# Patient Record
Sex: MALE | Race: WHITE | URBAN - METROPOLITAN AREA
[De-identification: names, ages, dates, MRNs, and addresses within clinical notes are randomized per-mention and may not be internally consistent; named-entity substitution may affect disease eponyms.]

---

## 2021-05-16 ENCOUNTER — HOSPITAL ENCOUNTER (EMERGENCY)
Age: 41
Discharge: HOME | End: 2021-05-16
Attending: EMERGENCY MEDICINE

## 2021-05-16 VITALS
HEART RATE: 98 BPM | DIASTOLIC BLOOD PRESSURE: 81 MMHG | RESPIRATION RATE: 18 BRPM | TEMPERATURE: 98.3 F | SYSTOLIC BLOOD PRESSURE: 145 MMHG | OXYGEN SATURATION: 98 %

## 2021-05-16 DIAGNOSIS — G56.03 BILATERAL CARPAL TUNNEL SYNDROME: Primary | ICD-10-CM

## 2021-05-16 PROCEDURE — 99282 EMERGENCY DEPT VISIT SF MDM: CPT

## 2021-05-16 RX ORDER — PREDNISONE 10 MG/1
TABLET ORAL
Qty: 20 TABLET | Refills: 0 | Status: SHIPPED | OUTPATIENT
Start: 2021-05-16 | End: 2021-05-26

## 2021-05-16 ASSESSMENT — PAIN DESCRIPTION - LOCATION: LOCATION: WRIST

## 2021-05-16 ASSESSMENT — PAIN DESCRIPTION - ORIENTATION: ORIENTATION: RIGHT;LEFT

## 2021-05-16 NOTE — ED NOTES
Pt stopped at desk and told staff he needed to step out to [employer] card. Pt proceeded to get in car and drive away.       Arlette Ibrahim RN  05/16/21 9897

## 2021-05-18 NOTE — ED PROVIDER NOTES
TRIAGE CHIEF COMPLAINT:   Chief Complaint   Patient presents with    Wrist Pain     r/t up arms          HPI: Mary Scott is a 41 y.o. male who presents to the Emergency Department with complaint of bilateral wrist pain secondary to a flareup of his bilateral carpal tunnel syndrome. Patient states he had abnormal EMGs in [place] where he lives. He states he had a negative MRI of his cervical spine. He is working in [employer] area doing [employer] work. He states he is scheduled for carpal tunnel surgery in [facility] in 3 weeks. Patient states he is having trouble sleeping at night because of the discomfort. He takes 1200 mg of gabapentin 3 times a day. He states he has tried multiple over-the-counter medicines without any relief. He does have splints that he wears always at night and sometimes during the day. REVIEW OF SYSTEMS:  6 systems reviewed. Pertinent positives per HPI. Otherwise noted to be negative. Nursing notes reviewed and agree with above. Past medical/surgical history reviewed. MEDICATIONS   Discharge Medication List as of 5/16/2021 11:35 AM      START taking these medications    Details   predniSONE (DELTASONE) 10 MG tablet 4 tabs po qam for 2 days then 3,2,1 tabs qam for 2 days each total of 10 days, Disp-20 tablet, R-0Print               ALLERGIES   Allergies   Allergen Reactions    Amoxicillin     Tramadol          BP (!) 145/81   Pulse 98   Temp 98.3 °F (36.8 °C)   Resp 18   SpO2 98%   General:  No acute distress. Non toxic appearance  Head:   Normocephalic and atraumatic  Eyes:   Conjunctiva clear, NIVIA, EOM's intact. ENT:   Mucous membranes moist  Neck:   Supple. No adenopathy. Lungs/Chest:  No respiratory distress  CVS:   Regular rate and rhythm  Extremities:  Examination of the upper extremity shows no deformity, swelling, redness or obvious tenderness. Distal neurovascular exam is intact.   Patient is intermittently shaking both of his wrists stating it is the only thing that helps with the discomfort. Skin:   No rashes or lesions to exposed skin  Neuro:  Alert and OX3. Speech clear and appropriate. No extremity weakness. Normal sensation in all extremities. No facial asymmetry. Gait normal.  Psych:   Affect normal. Mood normal        RADIOLOGY      LAB      ED COURSE / MDM:  66-year-old male, lives in [place] but working here in [address] [place] [address], history of bilateral carpal tunnel syndrome, reportedly scheduled for surgery in 3 weeks in [place], on high-dose gabapentin and wearing splints but states his pain has flared up and he is having trouble sleeping. Neurovascular exam is intact. Patient is asking for something stronger than over-the-counter medication to help him sleep and help his pain. I suggested trying a course of prednisone. 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 He was noted then to walk down the garduno out through the lobby and get into his car and drive away. I suspect the patient was hoping for controlled medications but I did not think this was appropriate. I discussed with Cleven Fleischer the results of evaluation in the Emergency Department, diagnosis, care and prognosis. The plan is to discharge to home. The patient is in agreement with the plan and questions have been answered. I also discussed with the patient and/or family the reasons which may require a return visit and the importance of follow-up care.        (Please note that portions of this note may have been completed with a voice recognition program.  Efforts were made to edit the dictation but occasionally words are mis-transcribed)        FINAL IMPRESSION:  1 --bilateral carpal tunnel syndrome     Saqib Henderson MD  05/18/21 [id number]